# Patient Record
(demographics unavailable — no encounter records)

---

## 2025-01-22 NOTE — HISTORY OF PRESENT ILLNESS
[FreeTextEntry1] : Follow up visit  Subacute sinusitis [de-identified] : Patient is a 58 year old female with PMH of HTN, GERD, Hypothyroidism, low back pain, hemorrhoids, arthritis, fatty liver, costochondritis,  osteopenia, HLD,  Patient presents for follow up for her chronic medical conditions. She reports compliance with all prescribed medical therapy and dietary  Also C/o nasal congestion, sneezing, runny nose for about 1 month , as per patient tested for COVID 19 - negative Currently on clonazePAM 2 MG Oral Tablet Ergocalciferol 1.25 MG (62493 UT) Oral Capsule; TAKE 1 CAPSULE WEEKLY Proctosol HC 2.5 % External Cream; USE SMALL AMOUNT RECTALLY THREE TIMES DAILY FOR 3 DAYS AS NEEDED Sertraline HCl - 100 MG Oral Tablet; TAKE 1 TABLET DAILY AS DIRECTED Simvastatin 20 MG Oral Tablet; take 1 tablet at bedtime

## 2025-01-22 NOTE — INTERPRETER SERVICES
[Language Line ] : provided by [employer]   [Time Spent: ____ minutes] : Total time spent using  services: [unfilled] minutes. The patient's primary language is not English thus required  services. [Interpreters_IDNumber] : [id number] [Interpreters_FullName] : Maddison [TWNoteComboBox1] : [place]